# Patient Record
Sex: FEMALE | Race: WHITE | Employment: UNEMPLOYED | URBAN - METROPOLITAN AREA
[De-identification: names, ages, dates, MRNs, and addresses within clinical notes are randomized per-mention and may not be internally consistent; named-entity substitution may affect disease eponyms.]

---

## 2019-12-10 ENCOUNTER — APPOINTMENT (OUTPATIENT)
Dept: GENERAL RADIOLOGY | Age: 64
End: 2019-12-10
Payer: COMMERCIAL

## 2019-12-10 ENCOUNTER — HOSPITAL ENCOUNTER (EMERGENCY)
Age: 64
Discharge: HOME | End: 2019-12-10
Attending: EMERGENCY MEDICINE
Payer: COMMERCIAL

## 2019-12-10 ENCOUNTER — APPOINTMENT (OUTPATIENT)
Dept: ULTRASOUND IMAGING | Age: 64
End: 2019-12-10
Payer: COMMERCIAL

## 2019-12-10 VITALS
HEIGHT: 64 IN | TEMPERATURE: 98.3 F | WEIGHT: 193 LBS | DIASTOLIC BLOOD PRESSURE: 88 MMHG | HEART RATE: 96 BPM | RESPIRATION RATE: 16 BRPM | OXYGEN SATURATION: 99 % | BODY MASS INDEX: 32.95 KG/M2 | SYSTOLIC BLOOD PRESSURE: 157 MMHG

## 2019-12-10 DIAGNOSIS — R05.3 CHRONIC COUGH: ICD-10-CM

## 2019-12-10 DIAGNOSIS — Z76.0 MEDICATION REFILL: ICD-10-CM

## 2019-12-10 DIAGNOSIS — R60.0 BILATERAL LEG EDEMA: Primary | ICD-10-CM

## 2019-12-10 PROCEDURE — 99283 EMERGENCY DEPT VISIT LOW MDM: CPT | Performed by: PHYSICIAN ASSISTANT

## 2019-12-10 PROCEDURE — 73610 X-RAY EXAM OF ANKLE: CPT

## 2019-12-10 PROCEDURE — 93970 EXTREMITY STUDY: CPT

## 2019-12-10 RX ORDER — LOSARTAN POTASSIUM 100 MG/1
100 TABLET ORAL DAILY
Qty: 30 TAB | Refills: 0 | Status: SHIPPED | OUTPATIENT
Start: 2019-12-10 | End: 2020-01-09

## 2019-12-10 NOTE — DISCHARGE INSTRUCTIONS
Patient Education        Leg and Ankle Edema: Care Instructions  Your Care Instructions  Swelling in the legs, ankles, and feet is called edema. It is common after you sit or stand for a while. Long plane flights or car rides often cause swelling in the legs and feet. You may also have swelling if you have to stand for long periods of time at your job. Problems with the veins in the legs (varicose veins) and changes in hormones can also cause swelling. Sometimes the swelling in the ankles and feet is caused by a more serious problem, such as heart failure, infection, blood clots, or liver or kidney disease. Follow-up care is a key part of your treatment and safety. Be sure to make and go to all appointments, and call your doctor if you are having problems. It's also a good idea to know your test results and keep a list of the medicines you take. How can you care for yourself at home? · If your doctor gave you medicine, take it as prescribed. Call your doctor if you think you are having a problem with your medicine. · Whenever you are resting, raise your legs up. Try to keep the swollen area higher than the level of your heart. · Take breaks from standing or sitting in one position. ? Walk around to increase the blood flow in your lower legs. ? Move your feet and ankles often while you stand, or tighten and relax your leg muscles. · Wear support stockings. Put them on in the morning, before swelling gets worse. · Eat a balanced diet. Lose weight if you need to. · Limit the amount of salt (sodium) in your diet. Salt holds fluid in the body and may increase swelling. When should you call for help? Call 911 anytime you think you may need emergency care. For example, call if:    · You have symptoms of a blood clot in your lung (called a pulmonary embolism). These may include:  ? Sudden chest pain. ? Trouble breathing. ?  Coughing up blood.    Call your doctor now or seek immediate medical care if:    · You have signs of a blood clot, such as:  ? Pain in your calf, back of the knee, thigh, or groin. ? Redness and swelling in your leg or groin.     · You have symptoms of infection, such as:  ? Increased pain, swelling, warmth, or redness. ? Red streaks or pus. ? A fever.    Watch closely for changes in your health, and be sure to contact your doctor if:    · Your swelling is getting worse.     · You have new or worsening pain in your legs.     · You do not get better as expected. Where can you learn more? Go to http://richar-sampson.info/. Enter Y857 in the search box to learn more about \"Leg and Ankle Edema: Care Instructions. \"  Current as of: June 26, 2019  Content Version: 12.2  © 6223-2574 [employer]. Care instructions adapted under license by [employer] (which disclaims liability or warranty for this information). If you have questions about a medical condition or this instruction, always ask your healthcare professional. [place] [address] any warranty or liability for your use of this information. Patient Education        Chronic Cough: Care Instructions  Your Care Instructions    A cough is your body's response to something that bothers your throat or airways. Many things can cause a cough. You might cough because of a cold or the flu, bronchitis, or asthma. Smoking, postnasal drip, allergies, and stomach acid that backs up into your throat also can cause a cough. A cough can be short-term (acute) or long-term (chronic). A chronic cough lasts more than 3 weeks. A chronic cough is often caused by a long-term problem, such as asthma. Another cause might be a medicine, such as an ACE inhibitor. A cough is a symptom, not a disease. To treat a chronic cough, you may need to treat the problem that causes it. You can take a few steps at home to cough less and feel better.   Some people cough or clear their throat out of habit for no clear reason. Follow-up care is a key part of your treatment and safety. Be sure to make and go to all appointments, and call your doctor if you are having problems. It's also a good idea to know your test results and keep a list of the medicines you take. How can you care for yourself at home? · Drink plenty of water and other fluids. This may help soothe a dry or sore throat. Honey or lemon juice in hot water or tea may ease a dry cough. · Prop up your head on pillows to help you breathe and ease a cough. · Do not smoke or allow others to smoke around you. Smoke can make a cough worse. If you need help quitting, talk to your doctor about stop-smoking programs and medicines. These can increase your chances of quitting for good. · Avoid exposure to smoke, dust, or other pollutants, or wear a face mask. Check with your doctor or pharmacist to find out which type of face mask will give you the most benefit. · Take cough medicine as directed by your doctor. · Try cough drops to soothe a dry or sore throat. Cough drops don't stop a cough. Medicine-flavored cough drops are no better than candy-flavored drops or hard candy. Throat clearing  When you have a chronic cough or a disease that may cause this type of cough, you may often feel like you want to clear your throat. This helps bring up mucus. But throat clearing does not always have a cause. Throat clearing can become a habit. The more you do it, the more you feel like you need to do it. But frequent throat clearing can be hard on your vocal cords. It's like slamming them together. To help lessen throat clearing, you can try:  · Taking small sips of water. · Not clearing your throat when you feel you need to. · Swallowing hard when you want to clear your throat. You may want to ask your doctor if a medicine that thins mucus would help. When should you call for help? Call 911 anytime you think you may need emergency care.  For example, call if:    · You have severe trouble breathing.    Call your doctor now or seek immediate medical care if:    · You cough up blood.     · You have new or worse trouble breathing.     · You have a new or higher fever.    Watch closely for changes in your health, and be sure to contact your doctor if:    · You cough more deeply or more often, especially if you notice more mucus or a change in the color of your mucus.     · You do not get better as expected. Where can you learn more? Go to http://richar-sampson.info/. Enter S823 in the search box to learn more about \"Chronic Cough: Care Instructions. \"  Current as of: June 9, 2019  Content Version: 12.2  © 2326-1290 [email or web address]. Care instructions adapted under license by [employer] (which disclaims liability or warranty for this information). If you have questions about a medical condition or this instruction, always ask your healthcare professional. [address] any warranty or liability for your use of this information.

## 2019-12-10 NOTE — ED NOTES
Pt c/o bilateral leg swelling with pitting edema. Pt has had her BP meds changed several times in the past year. Pt was on ACE Inhibitors, but developed a cough. Pt is having Thyroid studies done due to issues with tetany, weight and choking/coughing sensation in her throat.

## 2019-12-10 NOTE — ED NOTES
I have reviewed discharge instructions with the patient and caregiver. The patient and caregiver verbalized understanding. Patient left ED via Discharge Method: ambulatory to Home with her son. Opportunity for questions and clarification provided. Patient given 1 scripts. To continue your aftercare when you leave the hospital, you may receive an automated call from our care team to check in on how you are doing. This is a free service and part of our promise to provide the best care and service to meet your aftercare needs.  If you have questions, or wish to unsubscribe from this service please call [phone]. Thank you for Choosing our [facility].

## 2019-12-10 NOTE — ED PROVIDER NOTES
Patient is here with pain and swelling to both ankles and lower legs. She states that she turned her left ankle couple of weeks ago and her right one about a week ago and they have been hurting and swollen since. She is concerned that she may have damaged something in there or has a blood clot. She has no history of blood clots. She has a 7-year history of peripheral neuropathy. She is not a diabetic. She is unsure why she has neuropathy. She has also had a chronic cough for over a year and has been seeing Dr. Sabino Winter for this. She had been referred to ear nose and throat and had a laryngoscopy that revealed a deviated septum but was otherwise normal.  They did find a thyroid nodule and she is set to have that biopsied at [facility]. She has not had any chest pain or shortness of breath. No fever. No nausea or vomiting. She did ambulate to the room without difficulty and is well-hydrated. Her  is here with her. She moved from [facility] 3 years ago for her 's job and has been up here since. Patient is also asking for a refill of her losartan 100 mg. She had an appointment with [facility] today at noon however they called to cancel so she came here. They have rescheduled her for tomorrow. The history is provided by the patient. Leg Injury    This is a new problem. The current episode started more than 1 week ago. The problem occurs constantly. The problem has not changed since onset. The pain is present in the right ankle and left ankle. The quality of the pain is described as aching. The pain is at a severity of 6/10. The pain is moderate. Associated symptoms include limited range of motion and stiffness. Pertinent negatives include no numbness, no tingling, no itching, no back pain and no neck pain. The symptoms are aggravated by movement and activity. She has tried nothing for the symptoms. There has been a history of trauma.         No past medical history on file.    Past Surgical History:   Procedure Laterality Date    HX APPENDECTOMY      VAG HYST,RMV TUBE/OVARY Right          No family history on file. Social History     Socioeconomic History    Marital status:      Spouse name: Not on file    Number of children: Not on file    Years of education: Not on file    Highest education level: Not on file   Occupational History    Not on file   Social Needs    Financial resource strain: Not on file    Food insecurity:     Worry: Not on file     Inability: Not on file    Transportation needs:     Medical: Not on file     Non-medical: Not on file   Tobacco Use    Smoking status: Current Every Day Smoker    Smokeless tobacco: Never Used    Tobacco comment: 5 cigarettes a day   Substance and Sexual Activity    Alcohol use: Yes    Drug use: Not on file    Sexual activity: Not on file   Lifestyle    Physical activity:     Days per week: Not on file     Minutes per session: Not on file    Stress: Not on file   Relationships    Social connections:     Talks on phone: Not on file     Gets together: Not on file     Attends Faith service: Not on file     Active member of club or organization: Not on file     Attends meetings of clubs or organizations: Not on file     Relationship status: Not on file    Intimate partner violence:     Fear of current or ex partner: Not on file     Emotionally abused: Not on file     Physically abused: Not on file     Forced sexual activity: Not on file   Other Topics Concern    Not on file   Social History Narrative    Not on file         ALLERGIES: Gabapentin    Review of Systems   Constitutional: Negative. HENT: Negative. Eyes: Negative. Respiratory: Negative. Cardiovascular: Positive for leg swelling. Gastrointestinal: Negative. Genitourinary: Negative. Musculoskeletal: Positive for arthralgias, joint swelling, myalgias and stiffness. Negative for back pain, neck pain and neck stiffness.    Skin: Negative. Negative for itching. Neurological: Negative. Negative for tingling and numbness. Psychiatric/Behavioral: Negative. All other systems reviewed and are negative. Vitals:    12/10/19 1351   BP: 146/77   Pulse: (!) 117   Resp: 18   Temp: 98.5 °F (36.9 °C)   SpO2: 98%   Weight: 87.5 kg (193 lb)   Height: 5' 4\" (1.626 m)            Physical Exam  Constitutional:       General: She is not in acute distress. Appearance: She is well-developed. She is not ill-appearing or toxic-appearing. HENT:      Head: Normocephalic and atraumatic. Right Ear: Hearing, tympanic membrane and ear canal normal.      Left Ear: Hearing, tympanic membrane and ear canal normal.      Mouth/Throat:      Pharynx: Uvula midline. Tonsils: No tonsillar exudate. Swellin on the right. 0 on the left. Eyes:      Pupils: Pupils are equal, round, and reactive to light. Neck:      Musculoskeletal: Normal range of motion and neck supple. Cardiovascular:      Rate and Rhythm: Normal rate and regular rhythm. Heart sounds: Normal heart sounds. Pulmonary:      Effort: Pulmonary effort is normal.      Breath sounds: Normal breath sounds. Abdominal:      General: Bowel sounds are normal.      Palpations: Abdomen is soft. Musculoskeletal:        Feet:    Skin:     General: Skin is warm and dry. Capillary Refill: Capillary refill takes less than 2 seconds. Neurological:      Mental Status: She is alert and oriented to person, place, and time. GCS: GCS eye subscore is 4. GCS verbal subscore is 5. GCS motor subscore is 6. Cranial Nerves: Cranial nerves are intact. Sensory: Sensation is intact. Motor: Motor function is intact. Coordination: Coordination is intact. Gait: Gait is intact.    Psychiatric:         Behavior: Behavior normal.          MDM  Number of Diagnoses or Management Options  Bilateral leg edema:   Chronic cough:   Medication refill:      Amount and/or Complexity of Data Reviewed  Tests in the radiology section of CPT®: reviewed and ordered  Discuss the patient with other providers: yes (Dr. Edgard Diallo)    Risk of Complications, Morbidity, and/or Mortality  Presenting problems: moderate  Diagnostic procedures: moderate  Management options: moderate    Patient Progress  Patient progress: stable    ED Course as of Dec 10 1521   Tue Dec 10, 2019   1520 D/w PA- agree with assessment and plan    [GH]      ED Course User Index  [GH] Linn Baumgarten, MD       Procedures      3:12 PM Spoke with Dr. Edgard Diallo regarding patient. Discussed history, physical exam and imaging. The patient was observed in the ED. Results Reviewed:  DUPLEX LOWER EXT VENOUS BILAT   Final Result   Impression: No evidence of bilateral lower extremity deep venous thrombosis. XR ANKLE LT MIN 3 V   Final Result   Impression:  No evidence of acute injury. XR ANKLE RT MIN 3 V   Final Result   Impression:  No evidence of acute injury. Patient did have a normal nuclear medicine cardiac stress test in October with an ejection fraction of 70%. She also has seen the ENT for the chronic cough and is pending follow-up on her thyroid. The pain in her legs is chronic and has a 7-year history of neuropathy but of unknown type. She did turn both ankles at separate times over the last 3 weeks. I will refer her to orthopedics for this injury if the pain continues. I also will refer back to primary care, she has an appointment tomorrow. If they feel it is necessary they can refer to vascular as patient does smoke and has a extensive smoking history. She may also need nerve conduction studies. She also was told the cough may be coming from GERD however she states she has \"had that before and knows what that feels like. \"  Patient is stable for discharge at this time, no further work-up indicated. Her vital signs are stable.   She was tachycardic at her last visit with ENT on December 4, 2019. It was 116 bpm.   I discussed the results of all labs, procedures, radiographs, and treatments with the patient and available family. Treatment plan is agreed upon and the patient is ready for discharge. All voiced understanding of the discharge plan and medication instructions or changes as appropriate. Questions about treatment in the ED were answered. All were encouraged to return should symptoms worsen or new problems develop.

## 2019-12-10 NOTE — ED TRIAGE NOTES
Pt c/o intermittent leg swelling. Pt states the legs have been swelling for 2 weeks and she states at the end of the day they hurt really bad. Pt states the last doctor she saw for this \"was a joke. \" She has no PCP.

## 2020-01-03 PROBLEM — R60.0 LOCALIZED EDEMA: Status: ACTIVE | Noted: 2020-01-03

## 2020-01-03 PROBLEM — R53.82 CHRONIC FATIGUE: Status: ACTIVE | Noted: 2020-01-03

## 2020-01-03 PROBLEM — I10 ESSENTIAL HYPERTENSION, BENIGN: Status: ACTIVE | Noted: 2020-01-03

## 2020-01-13 ENCOUNTER — APPOINTMENT (RX ONLY)
Dept: URBAN - METROPOLITAN AREA CLINIC 349 | Facility: CLINIC | Age: 65
Setting detail: DERMATOLOGY
End: 2020-01-13

## 2020-01-13 DIAGNOSIS — L57.0 ACTINIC KERATOSIS: ICD-10-CM

## 2020-01-13 DIAGNOSIS — L72.8 OTHER FOLLICULAR CYSTS OF THE SKIN AND SUBCUTANEOUS TISSUE: ICD-10-CM

## 2020-01-13 DIAGNOSIS — D485 NEOPLASM OF UNCERTAIN BEHAVIOR OF SKIN: ICD-10-CM

## 2020-01-13 PROBLEM — I10 ESSENTIAL (PRIMARY) HYPERTENSION: Status: ACTIVE | Noted: 2020-01-13

## 2020-01-13 PROBLEM — H91.90 UNSPECIFIED HEARING LOSS, UNSPECIFIED EAR: Status: ACTIVE | Noted: 2020-01-13

## 2020-01-13 PROBLEM — D48.5 NEOPLASM OF UNCERTAIN BEHAVIOR OF SKIN: Status: ACTIVE | Noted: 2020-01-13

## 2020-01-13 PROCEDURE — ? TREATMENT REGIMEN

## 2020-01-13 PROCEDURE — 99202 OFFICE O/P NEW SF 15 MIN: CPT

## 2020-01-13 PROCEDURE — ? PRESCRIPTION

## 2020-01-13 PROCEDURE — ? OTHER

## 2020-01-13 PROCEDURE — ? OBSERVATION

## 2020-01-13 PROCEDURE — ? DEFER

## 2020-01-13 PROCEDURE — ? COUNSELING

## 2020-01-13 RX ORDER — CLOCORTOLONE PIVALATE 0 G/G
CREAM TOPICAL
Qty: 1 | Refills: 0 | Status: ERX | COMMUNITY
Start: 2020-01-13

## 2020-01-13 RX ADMIN — CLOCORTOLONE PIVALATE: 0 CREAM TOPICAL at 00:00

## 2020-01-13 ASSESSMENT — LOCATION DETAILED DESCRIPTION DERM
LOCATION DETAILED: LEFT RADIAL DORSAL HAND
LOCATION DETAILED: LEFT VENTRAL PROXIMAL FOREARM
LOCATION DETAILED: RIGHT CENTRAL BUCCAL CHEEK
LOCATION DETAILED: LEFT VENTRAL DISTAL FOREARM
LOCATION DETAILED: RIGHT RADIAL DORSAL HAND
LOCATION DETAILED: RIGHT NARIS
LOCATION DETAILED: RIGHT ANTERIOR DISTAL UPPER ARM

## 2020-01-13 ASSESSMENT — LOCATION SIMPLE DESCRIPTION DERM
LOCATION SIMPLE: LEFT FOREARM
LOCATION SIMPLE: RIGHT CHEEK
LOCATION SIMPLE: LEFT HAND
LOCATION SIMPLE: RIGHT UPPER ARM
LOCATION SIMPLE: RIGHT NOSE
LOCATION SIMPLE: RIGHT HAND

## 2020-01-13 ASSESSMENT — LOCATION ZONE DERM
LOCATION ZONE: ARM
LOCATION ZONE: HAND
LOCATION ZONE: NOSE
LOCATION ZONE: FACE

## 2020-01-13 NOTE — PROCEDURE: TREATMENT REGIMEN
Initiate Treatment: Cloderm apply to affected area twice daily for two weeks \\nAquaphor apply to affected area daily
Samples Given: Cloderm\\nAquaphor
Detail Level: Zone

## 2020-01-13 NOTE — PROCEDURE: DEFER
Introduction Text (Please End With A Colon): Procedure to be performe: Levulan
Detail Level: Detailed
Introduction Text (Please End With A Colon): Procedure to be performe: excision

## 2020-01-13 NOTE — PROCEDURE: OTHER
Note Text (......Xxx Chief Complaint.): This diagnosis correlates with the
Other (Free Text): Patient is here to have lesions on her arms and hands looked at. Patient states the lesions have been there for several years and she did a blue light treatment a few years ago. Patient denies any bleeding. Patient denies family or personal history of melanoma but has had several basal cells. \\n\\nPatient states she did not get a vigorous reaction from the blue light treatment she did a while back. \\n\\nClaire discussed treatment options with patient and discussed doing another light treatment. Patient is in agreement with this plan.\\n\\nCounseled on risks and side effects. Estimated siting for 1-1/2 hours
Detail Level: Zone
Other (Free Text): Patient has had several basal cells in the surrounding areas. If lesion fails to resolve we will biopsy to rule out BCC. \\n\\nTopical steroid was given. Patient counseled on risks and side effects.\\n\\nBlows nose a lot
Other (Free Text): Discussed treatment options. Patient will call if she decides she wants lesion excised.

## 2020-01-13 NOTE — HPI: SKIN LESION
How Severe Is Your Skin Lesion?: mild
Has Your Skin Lesion Been Treated?: not been treated
Is This A New Presentation, Or A Follow-Up?: Skin Lesions
Additional History: Patient is here to have lesions on her arms and hands looked at. Patient states the lesions have been there for several years and she did a blue light treatment a few years ago. Patient denies any bleeding. Patient denies family or personal history of melanoma but has had several basal cells.

## 2020-03-02 ENCOUNTER — APPOINTMENT (RX ONLY)
Dept: URBAN - METROPOLITAN AREA CLINIC 349 | Facility: CLINIC | Age: 65
Setting detail: DERMATOLOGY
End: 2020-03-02

## 2020-03-02 DIAGNOSIS — L57.0 ACTINIC KERATOSIS: ICD-10-CM

## 2020-03-02 DIAGNOSIS — L72.8 OTHER FOLLICULAR CYSTS OF THE SKIN AND SUBCUTANEOUS TISSUE: ICD-10-CM

## 2020-03-02 PROCEDURE — ? RECOMMENDATIONS

## 2020-03-02 PROCEDURE — ? LIQUID NITROGEN

## 2020-03-02 PROCEDURE — 11311 SHAVE SKIN LESION 0.6-1.0 CM: CPT

## 2020-03-02 PROCEDURE — 17000 DESTRUCT PREMALG LESION: CPT | Mod: 59

## 2020-03-02 PROCEDURE — 17003 DESTRUCT PREMALG LES 2-14: CPT

## 2020-03-02 PROCEDURE — ? PATHOLOGY BILLING

## 2020-03-02 PROCEDURE — A4550 SURGICAL TRAYS: HCPCS

## 2020-03-02 PROCEDURE — ? COUNSELING

## 2020-03-02 PROCEDURE — 88304 TISSUE EXAM BY PATHOLOGIST: CPT

## 2020-03-02 PROCEDURE — ? SHAVE REMOVAL

## 2020-03-02 PROCEDURE — 99213 OFFICE O/P EST LOW 20 MIN: CPT | Mod: 25

## 2020-03-02 ASSESSMENT — PAIN INTENSITY VAS: HOW INTENSE IS YOUR PAIN 0 BEING NO PAIN, 10 BEING THE MOST SEVERE PAIN POSSIBLE?: 1/10 PAIN

## 2020-03-02 ASSESSMENT — LOCATION DETAILED DESCRIPTION DERM
LOCATION DETAILED: LEFT RADIAL DORSAL HAND
LOCATION DETAILED: RIGHT CENTRAL BUCCAL CHEEK
LOCATION DETAILED: LEFT VENTRAL PROXIMAL FOREARM
LOCATION DETAILED: RIGHT RADIAL DORSAL HAND
LOCATION DETAILED: RIGHT CENTRAL BUCCAL CHEEK
LOCATION DETAILED: LEFT NASAL SIDEWALL
LOCATION DETAILED: RIGHT ANTERIOR DISTAL UPPER ARM

## 2020-03-02 ASSESSMENT — LOCATION ZONE DERM
LOCATION ZONE: FACE
LOCATION ZONE: HAND
LOCATION ZONE: NOSE
LOCATION ZONE: ARM
LOCATION ZONE: FACE

## 2020-03-02 ASSESSMENT — LOCATION SIMPLE DESCRIPTION DERM
LOCATION SIMPLE: LEFT FOREARM
LOCATION SIMPLE: RIGHT CHEEK
LOCATION SIMPLE: RIGHT UPPER ARM
LOCATION SIMPLE: RIGHT HAND
LOCATION SIMPLE: LEFT HAND
LOCATION SIMPLE: LEFT NOSE
LOCATION SIMPLE: RIGHT CHEEK

## 2020-03-02 NOTE — PROCEDURE: PATHOLOGY BILLING
Immunohistochemistry ([id number] and [id number]) billing is not performed here. Please use the Immunohistochemistry Stain Billing plan to accomplish this. Immunohistochemistry ([id number] and [id number]) billing is not performed here. Please use the Immunohistochemistry Stain Billing plan to accomplish this.

## 2020-03-02 NOTE — PROCEDURE: LIQUID NITROGEN
Duration Of Freeze Thaw-Cycle (Seconds): 3
Number Of Freeze-Thaw Cycles: 2 freeze-thaw cycles
Render Post-Care Instructions In Note?: no
Consent: The patient's consent was obtained including but not limited to risks of crusting, scabbing, blistering, scarring, darker or lighter pigmentary change, recurrence, incomplete removal and infection.
Post-Care Instructions: I reviewed with the patient in detail post-care instructions. Patient is to wear sunprotection, and avoid picking at any of the treated lesions. Pt may apply Vaseline to crusted or scabbing areas.
Detail Level: Detailed

## 2020-03-02 NOTE — PROCEDURE: RECOMMENDATIONS
Detail Level: Detailed
Recommendations (Free Text): Consider Amuluz treatment with red light with Kymberly applying in office and then light \\nPatient declines Liquid nitrogen or topical cream to treat the area

## 2020-03-02 NOTE — PROCEDURE: SHAVE REMOVAL
Was A Bandage Applied: Yes
Wound Care: Vaseline
Bill [id number] For Specimen Handling/Conveyance To Laboratory?: no
Notification Instructions: Patient will be notified of biopsy results. However, patient instructed to call the office if not contacted within 2 weeks.
Detail Level: Detailed
Medical Necessity Information: It is in your best interest to select a reason for this procedure from the list below. All of these items fulfill various CMS LCD requirements except the new and changing color options.
Size Of Lesion In Cm (Required): 1
Post-Care Instructions: I reviewed with the patient in detail post-care instructions. Patient is to keep the biopsy site dry overnight, and then apply vaseline twice daily until healed. Patient may apply hydrogen peroxide soaks to remove any crusting. After the procedure, the patient was observed for 5-10 minutes and was oriented to person, place and time and demied feeling dizzy, queasy, and stated that they did not feel that they were going to faint.
Anesthesia Type: 2% lidocaine with epinephrine
Anesthesia Volume In Cc: 0.5
X Size Of Lesion In Cm (Optional): 0
Billing Type: Third-Party Bill
Accession #: Dr Rob read
Biopsy Method: Dermablade
Medical Necessity Clause: This procedure was medically necessary because the lesion that was treated was: changing
Hemostasis: Electrocautery
Consent was obtained from the patient. The risks and benefits to therapy were discussed in detail. Specifically, the risks of infection, scarring, bleeding, prolonged wound healing, incomplete removal, allergy to anesthesia, nerve injury and recurrence were addressed. Prior to the procedure, the treatment site was clearly identified and confirmed by the patient. All components of Universal Protocol/PAUSE Rule completed.